# Patient Record
Sex: MALE | Race: OTHER | NOT HISPANIC OR LATINO | Employment: OTHER | URBAN - METROPOLITAN AREA
[De-identification: names, ages, dates, MRNs, and addresses within clinical notes are randomized per-mention and may not be internally consistent; named-entity substitution may affect disease eponyms.]

---

## 2017-06-01 ENCOUNTER — PREPPED CHART (OUTPATIENT)
Dept: URBAN - METROPOLITAN AREA CLINIC 43 | Facility: CLINIC | Age: 77
End: 2017-06-01

## 2018-05-31 ENCOUNTER — ESTABLISHED COMPREHENSIVE EXAM (OUTPATIENT)
Dept: URBAN - METROPOLITAN AREA CLINIC 43 | Facility: CLINIC | Age: 78
End: 2018-05-31

## 2018-05-31 DIAGNOSIS — H35.373: ICD-10-CM

## 2018-05-31 DIAGNOSIS — Z96.1: ICD-10-CM

## 2018-05-31 DIAGNOSIS — H35.3131: ICD-10-CM

## 2018-05-31 PROCEDURE — 4177F TALK PT/CRGVR RE AREDS PREV: CPT

## 2018-05-31 PROCEDURE — G8756 NO BP MEASURE DOC: HCPCS

## 2018-05-31 PROCEDURE — 92014 COMPRE OPH EXAM EST PT 1/>: CPT

## 2018-05-31 PROCEDURE — 92015 DETERMINE REFRACTIVE STATE: CPT

## 2018-05-31 PROCEDURE — 1036F TOBACCO NON-USER: CPT

## 2018-05-31 PROCEDURE — G8428 CUR MEDS NOT DOCUMENT: HCPCS

## 2018-05-31 PROCEDURE — 2019F DILATED MACUL EXAM DONE: CPT

## 2018-05-31 ASSESSMENT — TONOMETRY
OS_IOP_MMHG: 13
OD_IOP_MMHG: 15

## 2018-05-31 ASSESSMENT — VISUAL ACUITY
OD_SC: J2
OS_SC: J3
OD_SC: 20/40-1
OS_SC: 20/70-1

## 2019-08-20 PROBLEM — Z00.00 ENCOUNTER FOR PREVENTIVE HEALTH EXAMINATION: Status: ACTIVE | Noted: 2019-08-20

## 2019-10-16 ENCOUNTER — APPOINTMENT (OUTPATIENT)
Dept: INTERNAL MEDICINE | Facility: CLINIC | Age: 79
End: 2019-10-16

## 2021-06-01 ENCOUNTER — ESTABLISHED COMPREHENSIVE EXAM (OUTPATIENT)
Dept: URBAN - METROPOLITAN AREA CLINIC 43 | Facility: CLINIC | Age: 81
End: 2021-06-01

## 2021-06-01 DIAGNOSIS — Z96.1: ICD-10-CM

## 2021-06-01 DIAGNOSIS — H35.373: ICD-10-CM

## 2021-06-01 DIAGNOSIS — H35.3131: ICD-10-CM

## 2021-06-01 PROCEDURE — 92015 DETERMINE REFRACTIVE STATE: CPT

## 2021-06-01 PROCEDURE — 92014 COMPRE OPH EXAM EST PT 1/>: CPT

## 2021-06-01 ASSESSMENT — TONOMETRY
OD_IOP_MMHG: 14
OS_IOP_MMHG: 14

## 2021-06-01 ASSESSMENT — VISUAL ACUITY
OD_SC: J2-
OD_SC: 20/40-1
OS_SC: 20/50-1
OS_SC: J1-

## 2022-12-12 ENCOUNTER — COMPREHENSIVE EXAM (OUTPATIENT)
Dept: URBAN - METROPOLITAN AREA CLINIC 43 | Facility: CLINIC | Age: 82
End: 2022-12-12

## 2022-12-12 PROCEDURE — 92014 COMPRE OPH EXAM EST PT 1/>: CPT

## 2022-12-12 PROCEDURE — 92015 DETERMINE REFRACTIVE STATE: CPT

## 2022-12-12 ASSESSMENT — VISUAL ACUITY
OS_SC: 20/40-1
OD_SC: 20/40-1
OS_SC: J1-
OD_SC: J3

## 2022-12-12 ASSESSMENT — TONOMETRY
OS_IOP_MMHG: 16
OD_IOP_MMHG: 15

## 2023-09-05 ENCOUNTER — RESULT REVIEW (OUTPATIENT)
Age: 83
End: 2023-09-05

## 2023-09-06 ENCOUNTER — APPOINTMENT (OUTPATIENT)
Dept: NEUROSURGERY | Facility: HOSPITAL | Age: 83
End: 2023-09-06

## 2023-09-13 ENCOUNTER — TRANSCRIPTION ENCOUNTER (OUTPATIENT)
Age: 83
End: 2023-09-13

## 2023-09-17 DIAGNOSIS — Z86.79 PERSONAL HISTORY OF OTHER DISEASES OF THE CIRCULATORY SYSTEM: ICD-10-CM

## 2023-09-17 DIAGNOSIS — I51.9 HEART DISEASE, UNSPECIFIED: ICD-10-CM

## 2023-09-17 DIAGNOSIS — Z85.46 PERSONAL HISTORY OF MALIGNANT NEOPLASM OF PROSTATE: ICD-10-CM

## 2023-09-17 DIAGNOSIS — Z86.39 PERSONAL HISTORY OF OTHER ENDOCRINE, NUTRITIONAL AND METABOLIC DISEASE: ICD-10-CM

## 2023-09-17 DIAGNOSIS — S06.5X9A TRAUMATIC SUBDURAL HEMORRHAGE WITH LOSS OF CONSCIOUSNESS OF UNSPECIFIED DURATION, INITIAL ENCOUNTER: ICD-10-CM

## 2023-09-17 DIAGNOSIS — U07.1 COVID-19: ICD-10-CM

## 2023-09-17 DIAGNOSIS — Z85.528 PERSONAL HISTORY OF OTHER MALIGNANT NEOPLASM OF KIDNEY: ICD-10-CM

## 2023-09-17 RX ORDER — AZELASTINE HYDROCHLORIDE 137 UG/1
137 SPRAY, METERED NASAL
Refills: 0 | Status: ACTIVE | COMMUNITY

## 2023-09-17 RX ORDER — LOSARTAN POTASSIUM 100 MG/1
TABLET, FILM COATED ORAL
Refills: 0 | Status: ACTIVE | COMMUNITY

## 2023-09-17 RX ORDER — SIMVASTATIN 80 MG/1
TABLET, FILM COATED ORAL
Refills: 0 | Status: ACTIVE | COMMUNITY

## 2023-09-17 RX ORDER — COLESTIPOL HYDROCHLORIDE 1 G/1
TABLET, FILM COATED ORAL
Refills: 0 | Status: ACTIVE | COMMUNITY

## 2023-09-18 ENCOUNTER — APPOINTMENT (OUTPATIENT)
Dept: GERIATRICS | Facility: CLINIC | Age: 83
End: 2023-09-18
Payer: MEDICARE

## 2023-09-18 ENCOUNTER — MED ADMIN CHARGE (OUTPATIENT)
Age: 83
End: 2023-09-18

## 2023-09-18 VITALS
DIASTOLIC BLOOD PRESSURE: 60 MMHG | SYSTOLIC BLOOD PRESSURE: 118 MMHG | WEIGHT: 201 LBS | OXYGEN SATURATION: 99 % | HEART RATE: 86 BPM | HEIGHT: 71 IN | TEMPERATURE: 97.4 F | BODY MASS INDEX: 28.14 KG/M2

## 2023-09-18 DIAGNOSIS — Z71.89 OTHER SPECIFIED COUNSELING: ICD-10-CM

## 2023-09-18 DIAGNOSIS — D64.9 ANEMIA, UNSPECIFIED: ICD-10-CM

## 2023-09-18 DIAGNOSIS — R73.01 IMPAIRED FASTING GLUCOSE: ICD-10-CM

## 2023-09-18 DIAGNOSIS — Z13.1 ENCOUNTER FOR SCREENING FOR DIABETES MELLITUS: ICD-10-CM

## 2023-09-18 DIAGNOSIS — Z23 ENCOUNTER FOR IMMUNIZATION: ICD-10-CM

## 2023-09-18 PROCEDURE — G0008: CPT

## 2023-09-18 PROCEDURE — 99205 OFFICE O/P NEW HI 60 MIN: CPT | Mod: 25

## 2023-09-18 PROCEDURE — 90662 IIV NO PRSV INCREASED AG IM: CPT

## 2023-09-18 RX ORDER — SPIRONOLACTONE 25 MG/1
25 TABLET, FILM COATED ORAL DAILY
Refills: 0 | Status: ACTIVE | COMMUNITY

## 2023-09-20 LAB
ANION GAP SERPL CALC-SCNC: 9 MMOL/L
BUN SERPL-MCNC: 29 MG/DL
CALCIUM SERPL-MCNC: 8.7 MG/DL
CHLORIDE SERPL-SCNC: 105 MMOL/L
CHOLEST SERPL-MCNC: 156 MG/DL
CO2 SERPL-SCNC: 25 MMOL/L
CREAT SERPL-MCNC: 1.4 MG/DL
EGFR: 50 ML/MIN/1.73M2
ESTIMATED AVERAGE GLUCOSE: 105 MG/DL
FERRITIN SERPL-MCNC: 345 NG/ML
GLUCOSE SERPL-MCNC: 83 MG/DL
HBA1C MFR BLD HPLC: 5.3 %
HDLC SERPL-MCNC: 70 MG/DL
IRON SATN MFR SERPL: 39 %
IRON SERPL-MCNC: 97 UG/DL
LDLC SERPL CALC-MCNC: 77 MG/DL
NONHDLC SERPL-MCNC: 87 MG/DL
POTASSIUM SERPL-SCNC: 4.7 MMOL/L
SODIUM SERPL-SCNC: 140 MMOL/L
TIBC SERPL-MCNC: 248 UG/DL
TRANSFERRIN SERPL-MCNC: 202 MG/DL
TRIGL SERPL-MCNC: 46 MG/DL
UIBC SERPL-MCNC: 151 UG/DL

## 2023-10-02 ENCOUNTER — APPOINTMENT (OUTPATIENT)
Dept: CARDIOLOGY | Facility: CLINIC | Age: 83
End: 2023-10-02
Payer: MEDICARE

## 2023-10-02 ENCOUNTER — NON-APPOINTMENT (OUTPATIENT)
Age: 83
End: 2023-10-02

## 2023-10-02 VITALS
WEIGHT: 195 LBS | HEIGHT: 71 IN | SYSTOLIC BLOOD PRESSURE: 98 MMHG | OXYGEN SATURATION: 98 % | BODY MASS INDEX: 27.3 KG/M2 | HEART RATE: 57 BPM | DIASTOLIC BLOOD PRESSURE: 53 MMHG

## 2023-10-02 DIAGNOSIS — Z78.9 OTHER SPECIFIED HEALTH STATUS: ICD-10-CM

## 2023-10-02 PROCEDURE — 93000 ELECTROCARDIOGRAM COMPLETE: CPT

## 2023-10-02 PROCEDURE — 99214 OFFICE O/P EST MOD 30 MIN: CPT | Mod: 25

## 2023-10-02 RX ORDER — METOPROLOL SUCCINATE 25 MG/1
25 TABLET, EXTENDED RELEASE ORAL DAILY
Qty: 90 | Refills: 3 | Status: ACTIVE | COMMUNITY
Start: 2023-10-02 | End: 1900-01-01

## 2023-10-05 ENCOUNTER — NON-APPOINTMENT (OUTPATIENT)
Age: 83
End: 2023-10-05

## 2023-10-05 ENCOUNTER — APPOINTMENT (OUTPATIENT)
Dept: GERIATRICS | Facility: CLINIC | Age: 83
End: 2023-10-05
Payer: MEDICARE

## 2023-10-05 VITALS
DIASTOLIC BLOOD PRESSURE: 68 MMHG | OXYGEN SATURATION: 98 % | BODY MASS INDEX: 27.89 KG/M2 | WEIGHT: 200 LBS | HEART RATE: 79 BPM | TEMPERATURE: 97.4 F | SYSTOLIC BLOOD PRESSURE: 112 MMHG

## 2023-10-05 DIAGNOSIS — N18.9 CHRONIC KIDNEY DISEASE, UNSPECIFIED: ICD-10-CM

## 2023-10-05 DIAGNOSIS — R35.0 FREQUENCY OF MICTURITION: ICD-10-CM

## 2023-10-05 PROBLEM — Z78.9 NON-SMOKER: Status: ACTIVE | Noted: 2023-10-05

## 2023-10-05 PROCEDURE — 99215 OFFICE O/P EST HI 40 MIN: CPT

## 2023-10-05 RX ORDER — SPIRONOLACTONE 25 MG/1
25 TABLET ORAL
Refills: 0 | Status: DISCONTINUED | COMMUNITY
End: 2023-10-05

## 2023-10-05 RX ORDER — ACETAMINOPHEN 325 MG/1
TABLET, FILM COATED ORAL
Refills: 0 | Status: ACTIVE | COMMUNITY

## 2023-10-05 RX ORDER — AMLODIPINE BESYLATE 5 MG/1
TABLET ORAL
Refills: 0 | Status: DISCONTINUED | COMMUNITY
End: 2023-10-05

## 2023-10-05 RX ORDER — FLUTICASONE PROPIONATE 50 UG/1
50 SPRAY, METERED NASAL
Refills: 0 | Status: ACTIVE | COMMUNITY

## 2023-10-11 ENCOUNTER — APPOINTMENT (OUTPATIENT)
Dept: NEUROSURGERY | Facility: CLINIC | Age: 83
End: 2023-10-11
Payer: MEDICARE

## 2023-10-11 DIAGNOSIS — D32.0 BENIGN NEOPLASM OF CEREBRAL MENINGES: ICD-10-CM

## 2023-10-11 PROCEDURE — 99024 POSTOP FOLLOW-UP VISIT: CPT

## 2023-10-13 ENCOUNTER — APPOINTMENT (OUTPATIENT)
Dept: CARDIOLOGY | Facility: CLINIC | Age: 83
End: 2023-10-13
Payer: MEDICARE

## 2023-10-13 PROCEDURE — 93242 EXT ECG>48HR<7D RECORDING: CPT

## 2023-10-28 DIAGNOSIS — Z86.79 PERSONAL HISTORY OF OTHER DISEASES OF THE CIRCULATORY SYSTEM: ICD-10-CM

## 2023-10-28 PROCEDURE — 93244 EXT ECG>48HR<7D REV&INTERPJ: CPT

## 2023-11-14 ENCOUNTER — APPOINTMENT (OUTPATIENT)
Dept: CARDIOLOGY | Facility: CLINIC | Age: 83
End: 2023-11-14
Payer: MEDICARE

## 2023-11-14 VITALS
SYSTOLIC BLOOD PRESSURE: 140 MMHG | BODY MASS INDEX: 30.1 KG/M2 | HEIGHT: 71 IN | DIASTOLIC BLOOD PRESSURE: 90 MMHG | OXYGEN SATURATION: 97 % | HEART RATE: 88 BPM | WEIGHT: 215 LBS

## 2023-11-14 DIAGNOSIS — I38 ENDOCARDITIS, VALVE UNSPECIFIED: ICD-10-CM

## 2023-11-14 DIAGNOSIS — I10 ESSENTIAL (PRIMARY) HYPERTENSION: ICD-10-CM

## 2023-11-14 DIAGNOSIS — I48.20 CHRONIC ATRIAL FIBRILLATION, UNSP: ICD-10-CM

## 2023-11-14 DIAGNOSIS — Z78.9 OTHER SPECIFIED HEALTH STATUS: ICD-10-CM

## 2023-11-14 DIAGNOSIS — I51.9 HEART DISEASE, UNSPECIFIED: ICD-10-CM

## 2023-11-14 DIAGNOSIS — I50.9 HEART FAILURE, UNSPECIFIED: ICD-10-CM

## 2023-11-14 DIAGNOSIS — E78.5 HYPERLIPIDEMIA, UNSPECIFIED: ICD-10-CM

## 2023-11-14 DIAGNOSIS — Z86.018 PERSONAL HISTORY OF OTHER BENIGN NEOPLASM: ICD-10-CM

## 2023-11-14 PROCEDURE — 93000 ELECTROCARDIOGRAM COMPLETE: CPT

## 2023-11-14 PROCEDURE — 99214 OFFICE O/P EST MOD 30 MIN: CPT | Mod: 25

## 2023-11-15 ENCOUNTER — RESULT REVIEW (OUTPATIENT)
Age: 83
End: 2023-11-15

## 2023-11-17 PROBLEM — I48.20 CHRONIC ATRIAL FIBRILLATION: Status: ACTIVE | Noted: 2023-09-17

## 2023-11-17 PROBLEM — I51.9 LEFT VENTRICULAR SYSTOLIC DYSFUNCTION: Status: ACTIVE | Noted: 2023-10-02

## 2023-11-17 PROBLEM — I38 VALVULAR HEART DISEASE: Status: ACTIVE | Noted: 2023-10-05

## 2023-11-17 PROBLEM — I10 HYPERTENSION, UNSPECIFIED TYPE: Status: ACTIVE | Noted: 2023-09-17

## 2023-11-17 PROBLEM — E78.5 HYPERLIPIDEMIA, UNSPECIFIED HYPERLIPIDEMIA TYPE: Status: ACTIVE | Noted: 2023-09-17

## 2023-11-17 PROBLEM — I50.9 CONGESTIVE HEART FAILURE, UNSPECIFIED HF CHRONICITY, UNSPECIFIED HEART FAILURE TYPE: Status: ACTIVE | Noted: 2023-09-18

## 2023-11-17 RX ORDER — METOPROLOL SUCCINATE 200 MG/1
TABLET, EXTENDED RELEASE ORAL
Refills: 0 | Status: ACTIVE | COMMUNITY

## 2023-11-19 ENCOUNTER — NON-APPOINTMENT (OUTPATIENT)
Age: 83
End: 2023-11-19

## 2023-11-19 PROBLEM — Z78.9 SOCIAL ALCOHOL USE: Status: ACTIVE | Noted: 2023-11-19

## 2023-11-19 PROBLEM — Z86.018 HISTORY OF MENINGIOMA: Status: RESOLVED | Noted: 2023-11-19 | Resolved: 2023-11-19

## 2023-11-19 RX ORDER — APIXABAN 5 MG/1
TABLET, FILM COATED ORAL
Refills: 0 | Status: ACTIVE | COMMUNITY
Start: 2023-09-28

## 2023-12-12 ENCOUNTER — COMPREHENSIVE EXAM (OUTPATIENT)
Dept: URBAN - METROPOLITAN AREA CLINIC 43 | Facility: CLINIC | Age: 83
End: 2023-12-12

## 2023-12-12 DIAGNOSIS — H04.123: ICD-10-CM

## 2023-12-12 DIAGNOSIS — Z96.1: ICD-10-CM

## 2023-12-12 DIAGNOSIS — H35.373: ICD-10-CM

## 2023-12-12 DIAGNOSIS — H35.3131: ICD-10-CM

## 2023-12-12 PROCEDURE — 92015 DETERMINE REFRACTIVE STATE: CPT

## 2023-12-12 PROCEDURE — 92014 COMPRE OPH EXAM EST PT 1/>: CPT

## 2023-12-12 ASSESSMENT — VISUAL ACUITY
OS_SC: J2
OD_SC: 20/40-2
OD_SC: J4
OS_SC: 20/30-1

## 2023-12-12 ASSESSMENT — TONOMETRY
OD_IOP_MMHG: 14
OS_IOP_MMHG: 14

## 2024-01-16 RX ORDER — FUROSEMIDE 20 MG/1
20 TABLET ORAL
Qty: 90 | Refills: 1 | Status: ACTIVE | COMMUNITY
Start: 2023-11-14

## 2024-05-28 ENCOUNTER — APPOINTMENT (OUTPATIENT)
Dept: CARDIOLOGY | Facility: CLINIC | Age: 84
End: 2024-05-28

## 2024-06-13 RX ORDER — METOPROLOL SUCCINATE 50 MG/1
50 TABLET, EXTENDED RELEASE ORAL
Qty: 180 | Refills: 1 | Status: ACTIVE | COMMUNITY
Start: 2023-11-14

## 2024-07-10 ENCOUNTER — RX RENEWAL (OUTPATIENT)
Age: 84
End: 2024-07-10

## 2024-08-26 DIAGNOSIS — N28.1 CYST OF KIDNEY, ACQUIRED: ICD-10-CM

## 2024-08-26 DIAGNOSIS — Z95.2 PRESENCE OF PROSTHETIC HEART VALVE: ICD-10-CM

## 2024-08-26 DIAGNOSIS — Z86.79 PERSONAL HISTORY OF OTHER DISEASES OF THE CIRCULATORY SYSTEM: ICD-10-CM

## 2024-08-26 DIAGNOSIS — R91.1 SOLITARY PULMONARY NODULE: ICD-10-CM

## 2024-08-26 DIAGNOSIS — Z87.19 PERSONAL HISTORY OF OTHER DISEASES OF THE DIGESTIVE SYSTEM: ICD-10-CM

## 2024-08-26 DIAGNOSIS — M15.9 POLYOSTEOARTHRITIS, UNSPECIFIED: ICD-10-CM

## 2024-08-26 DIAGNOSIS — I87.2 VENOUS INSUFFICIENCY (CHRONIC) (PERIPHERAL): ICD-10-CM

## 2024-08-26 DIAGNOSIS — K76.89 OTHER SPECIFIED DISEASES OF LIVER: ICD-10-CM

## 2024-08-26 DIAGNOSIS — Z87.898 PERSONAL HISTORY OF OTHER SPECIFIED CONDITIONS: ICD-10-CM

## 2024-08-28 ENCOUNTER — NON-APPOINTMENT (OUTPATIENT)
Age: 84
End: 2024-08-28

## 2024-08-28 ENCOUNTER — APPOINTMENT (OUTPATIENT)
Dept: GERIATRICS | Facility: CLINIC | Age: 84
End: 2024-08-28
Payer: MEDICARE

## 2024-08-28 ENCOUNTER — APPOINTMENT (OUTPATIENT)
Dept: CARDIOLOGY | Facility: CLINIC | Age: 84
End: 2024-08-28
Payer: MEDICARE

## 2024-08-28 VITALS
SYSTOLIC BLOOD PRESSURE: 142 MMHG | DIASTOLIC BLOOD PRESSURE: 78 MMHG | HEIGHT: 71 IN | WEIGHT: 190 LBS | BODY MASS INDEX: 26.6 KG/M2 | HEART RATE: 48 BPM | OXYGEN SATURATION: 99 %

## 2024-08-28 VITALS
HEART RATE: 50 BPM | OXYGEN SATURATION: 99 % | TEMPERATURE: 97.3 F | WEIGHT: 188 LBS | HEIGHT: 71 IN | SYSTOLIC BLOOD PRESSURE: 120 MMHG | DIASTOLIC BLOOD PRESSURE: 70 MMHG | BODY MASS INDEX: 26.32 KG/M2

## 2024-08-28 VITALS
DIASTOLIC BLOOD PRESSURE: 78 MMHG | WEIGHT: 190 LBS | HEART RATE: 48 BPM | HEIGHT: 71 IN | BODY MASS INDEX: 26.6 KG/M2 | SYSTOLIC BLOOD PRESSURE: 142 MMHG | OXYGEN SATURATION: 99 %

## 2024-08-28 DIAGNOSIS — K52.832 LYMPHOCYTIC COLITIS: ICD-10-CM

## 2024-08-28 DIAGNOSIS — E78.5 HYPERLIPIDEMIA, UNSPECIFIED: ICD-10-CM

## 2024-08-28 DIAGNOSIS — I48.91 UNSPECIFIED ATRIAL FIBRILLATION: ICD-10-CM

## 2024-08-28 DIAGNOSIS — Z86.79 PERSONAL HISTORY OF OTHER DISEASES OF THE CIRCULATORY SYSTEM: ICD-10-CM

## 2024-08-28 DIAGNOSIS — I51.9 HEART DISEASE, UNSPECIFIED: ICD-10-CM

## 2024-08-28 DIAGNOSIS — I10 ESSENTIAL (PRIMARY) HYPERTENSION: ICD-10-CM

## 2024-08-28 DIAGNOSIS — K59.09 OTHER CONSTIPATION: ICD-10-CM

## 2024-08-28 DIAGNOSIS — Z81.8 FAMILY HISTORY OF OTHER MENTAL AND BEHAVIORAL DISORDERS: ICD-10-CM

## 2024-08-28 DIAGNOSIS — I50.9 HEART FAILURE, UNSPECIFIED: ICD-10-CM

## 2024-08-28 DIAGNOSIS — K52.9 NONINFECTIVE GASTROENTERITIS AND COLITIS, UNSPECIFIED: ICD-10-CM

## 2024-08-28 DIAGNOSIS — R00.1 BRADYCARDIA, UNSPECIFIED: ICD-10-CM

## 2024-08-28 PROCEDURE — 93246 EXT ECG>7D<15D RECORDING: CPT

## 2024-08-28 PROCEDURE — 99215 OFFICE O/P EST HI 40 MIN: CPT

## 2024-08-28 PROCEDURE — G0444 DEPRESSION SCREEN ANNUAL: CPT | Mod: 59

## 2024-08-28 PROCEDURE — 93000 ELECTROCARDIOGRAM COMPLETE: CPT | Mod: 59

## 2024-08-28 PROCEDURE — G2211 COMPLEX E/M VISIT ADD ON: CPT

## 2024-08-28 PROCEDURE — 99214 OFFICE O/P EST MOD 30 MIN: CPT

## 2024-08-28 NOTE — HISTORY OF PRESENT ILLNESS
[No falls in past year] : Patient reported no falls in the past year [NO] : No [0] : 2) Feeling down, depressed, or hopeless: Not at all (0) [PHQ-2 Negative - No further assessment needed] : PHQ-2 Negative - No further assessment needed [I have developed a follow-up plan documented below in the note.] : I have developed a follow-up plan documented below in the note. [FreeTextEntry1] :  is an 83yo male with HTN, HLD, afib on eliquis, hx of prostate and renal Ca (s/p kidney surgery and prostatectomy), hx subdural hematoma, hx seizures  h/o lymphocytic colitis. wants to switch from coletipol to something else.  had valve replacement x2 in april 2024.mitral and aortic. doing cardiac rehab.   #Afib eliquis + metoprolol ER 50mg QD  #CHF  lasix, simvastatin,   constipation  10/03/2024 Was admitted on 09/01 for c/o head pressure, found to have 2.9 cm solid mass in the L choroid plexus with surrounding vasogenic edema.  CT C and CT A/P concerning for pulmonary nodules + liver lesions. Received dexamethasone 4mg q6h. Echo showed EF-43%. 09/06 underwent L  temporal craniotomy resection of left ventricular mass complicated by aphasia and AAO x0 post-op. SLP f/u o/p. Agitation improved 09/11. 09/13 d/c with steroid taper over 1 week.2 mg Q8H for two days, Q12H for two days, daily for two days) 10 Per cardiology note, okay to start Eliquis for A-fib.  Patient is now on metoprolol succinate 25 mg daily and amlodipine has been discontinued.  Patient in discussion of cardiology about pulmonary vein isolation/radiofrequency ablation Can consider switch from losartan to Entresto if patient's vitals remained stable per cardiology.  Really appreciate and agree with recommendations.     #Multicomplexity  #Meningioma s/p L craniotomy with resection 09/06/23 O/P f/u with cardiology and also to discuss resuming eliquis NSG 11 oct f/u   #Afib on eliquis since june 2023 Hold eliquis until cards f/u Dr. 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  We will continue reduced dose of Aldactone  #CKD stage 3a 2/2 renal Ca (s/p kidney surgery and prostatectomy) referred to nephrology  last creat 1.4  Vaccines due: Tdap, pneumococcal, Shingrix.  Discussed with patient to get these vaccines. Patient has his birthday tomorrow will get these after his birthday.   [[id number]] : 0

## 2024-08-28 NOTE — PHYSICAL EXAM
[No Respiratory Distress] : no respiratory distress [No Acc Muscle Use] : no accessory muscle use [Respiration, Rhythm And Depth] : normal respiratory rhythm and effort [Auscultation Breath Sounds / Voice Sounds] : lungs were clear to auscultation bilaterally [Normal S1, S2] : normal S1 and S2 [Heart Rate And Rhythm] : heart rate was normal and rhythm regular [de-identified] : Has lipoma on his upper back left side

## 2024-08-29 PROBLEM — R00.1 BRADYCARDIA: Status: ACTIVE | Noted: 2024-08-29

## 2024-08-29 PROBLEM — I48.91 ATRIAL FIBRILLATION: Status: ACTIVE | Noted: 2024-08-26

## 2024-08-29 PROBLEM — Z86.79 HISTORY OF VALVULAR HEART DISEASE: Status: RESOLVED | Noted: 2023-09-17 | Resolved: 2024-08-29

## 2024-08-29 NOTE — DISCUSSION/SUMMARY
[FreeTextEntry1] : Atrial fibrillation/Bradycardia The working diagnosis is  atrial fibrillation secondary to hypertensive ??-atherosclerotic-valvular  heart disease.  At the time of the 4/20 4 aortic/mitral valve replacement surgery left atrial appendage ligation and pulmonary vein isolation was performed.  Subsequently electrocardioversion restored sinus rhythm.  An elevated [id number] vascular score is indicative of an increased risk of systemic/cerebral emboli. . Marked asymptomatic sinus bradycardia is in large part related to the administration of beta-blockers (metoprolol succinate 50 mg/day)Permanent pacemaker implantation is not indicated in the absence of symptomatic bradycardia or high degree AV block  I have recommended the following Continue the present medical regimen Mo 2-week mobile telemetry study 4/24 coronary arteriogram study not available at this time is requested for review.     Left ventricular systolic dysfunction/congestive heart failure.   The working diagnosis is  compensated  congestive heart failure secondary to heart failure with reduced ejection fraction (9/23 echocardiogram left ventricular ejection fraction 43%) due to hypertensive atherosclerotic-valvular heart disease exacerbated by atrial fibrillation with rapid ventricular rates. Following the 4/24 aortic/mitral valve replacement left ventricular systolic function has normalized.  The 7/24 echocardiogram revealed normal functioning of the bioprosthetic aortic/mitral valves.  The pulmonary artery systolic pressure was 20 mmHg.  The left ventricular ejection fraction was 60-65%.  The present cardiac physical examination is consistent with normal functioning of the prosthetic mitral/aortic valves and a euvolemic state New York Heart Association class II-III.  I have recommended the following Continue the present medical regimen Echocardiogram with next office evaluation Valve replacement surgery operative report, hospital medical records and coronary arteriogram study all not available at this time are requested for review Cardiac rehabilitation program to continue   Hypertension Hypertension is reportedly   controlled on the present medical regimen. Elevation of blood pressure on initial examination today (154/71 mmHg) is attributed in part to a whitecoat effect  .Renal insufficiency will influence medical management of hypertension  I have recommended the following a. Continue the present medical regimen b. Home blood pressure monitoring c  Routine laboratory studies including electrolytes and renal function test through primary care Dr. Blane ambrocio  Antihypertensive medical therapy adjustment dictated by the above and the patient's clinical course       Hyperlipidemia Hyperlipidemia represents a risk factor for atherosclerotic disease.  Coronary artery bypass graft surgery was not performed at the time of valve replacement suggesting the significant atherosclerotic heart disease The target LDL level is about 100  [employer] a reductase inhibitor therapy has been effective and well-tolerated  .In 9/23 the serum triglyceride level was 46 cholesterol 156 HDL 70 and LDL 77  .More recent lipid levels are not available for review.  The dose of simvastatin may be increased or replaced by a high intensity statin (rosuvastatin/atorvastatin) if required to  maintain optimal levels.  I have recommended the following: Low-fat low-cholesterol low-salt heart healthy diet.  Regular aerobic exercise to the extent possible Target LDL level to about 100 Noninvasive studies for coronary disease not available at this time are requested for review Increase simvastatin dose or replace with high intensity statin (rosuvastatin/atorvastatin) if required to maintain optimal levels as discussed above  lipid profile through primary care physician Dr. Arguelles   The diagnosis, prognosis, risks, options and alternatives were explained at length to the patient and family.  All questions were answered.  Issues discussed included cardiac rehabilitation atrial fibrillation anticoagulation left ventricular systolic dysfunction  congestive heart failure hypertension/hypotension hyperlipidemia diet and exercise.  Counseling and/or coordination of care Time was a significant factor for this patient encounter.  Total time spent with the patient and family was 40 minutes.  Greater than 50% of the time was devoted to counseling and/or coordination of care

## 2024-08-29 NOTE — PHYSICAL EXAM
[Normal Conjunctiva] : normal conjunctiva [Normal S1, S2] : normal S1, S2 [Soft] : abdomen soft [Non Tender] : non-tender [Normal Bowel Sounds] : normal bowel sounds [Normal Gait] : normal gait [No Rash] : no rash [No Focal Deficits] : no focal deficits [de-identified] :  Appears fit younger than his stated age awake and alert lying flat [de-identified] : No neck vein distention.  No carotid bruit [de-identified] : regular rhythm no gallop.  No murmur.  No diastolic sounds [de-identified] : Decreased breath sounds at the left  base.  No wheezing.  No rales [de-identified] : Full range of motion. Median sternotomy scar well-healed [de-identified] : Trace edema bilaterally.. venous stasis changes [de-identified] : parishnt

## 2024-08-29 NOTE — DISCUSSION/SUMMARY
[FreeTextEntry1] : Atrial fibrillation/Bradycardia The working diagnosis is  atrial fibrillation secondary to hypertensive ??-atherosclerotic-valvular  heart disease.  At the time of the 4/20 4 aortic/mitral valve replacement surgery left atrial appendage ligation and pulmonary vein isolation was performed.  Subsequently electrocardioversion restored sinus rhythm.  An elevated [id number] vascular score is indicative of an increased risk of systemic/cerebral emboli. . Marked asymptomatic sinus bradycardia is in large part related to the administration of beta-blockers (metoprolol succinate 50 mg/day)Permanent pacemaker implantation is not indicated in the absence of symptomatic bradycardia or high degree AV block  I have recommended the following Continue the present medical regimen Mo 2-week mobile telemetry study 4/24 coronary arteriogram study not available at this time is requested for review.     Left ventricular systolic dysfunction/congestive heart failure.   The working diagnosis is  compensated  congestive heart failure secondary to heart failure with reduced ejection fraction (9/23 echocardiogram left ventricular ejection fraction 43%) due to hypertensive atherosclerotic-valvular heart disease exacerbated by atrial fibrillation with rapid ventricular rates. Following the 4/24 aortic/mitral valve replacement left ventricular systolic function has normalized.  The 7/24 echocardiogram revealed normal functioning of the bioprosthetic aortic/mitral valves.  The pulmonary artery systolic pressure was 20 mmHg.  The left ventricular ejection fraction was 60-65%.  The present cardiac physical examination is consistent with normal functioning of the prosthetic mitral/aortic valves and a euvolemic state New York Heart Association class II-III.  I have recommended the following Continue the present medical regimen Echocardiogram with next office evaluation Valve replacement surgery operative report, hospital medical records and coronary arteriogram study all not available at this time are requested for review Cardiac rehabilitation program to continue   Hypertension Hypertension is reportedly   controlled on the present medical regimen. Elevation of blood pressure on initial examination today (154/71 mmHg) is attributed in part to a whitecoat effect  .Renal insufficiency will influence medical management of hypertension  I have recommended the following a. Continue the present medical regimen b. Home blood pressure monitoring c  Routine laboratory studies including electrolytes and renal function test through primary care Dr. Blane ambrocio  Antihypertensive medical therapy adjustment dictated by the above and the patient's clinical course       Hyperlipidemia Hyperlipidemia represents a risk factor for atherosclerotic disease.  Coronary artery bypass graft surgery was not performed at the time of valve replacement suggesting the significant atherosclerotic heart disease The target LDL level is about 100  [employer] a reductase inhibitor therapy has been effective and well-tolerated  .In 9/23 the serum triglyceride level was 46 cholesterol 156 HDL 70 and LDL 77  .More recent lipid levels are not available for review.  The dose of simvastatin may be increased or replaced by a high intensity statin (rosuvastatin/atorvastatin) if required to  maintain optimal levels.  I have recommended the following: Low-fat low-cholesterol low-salt heart healthy diet.  Regular aerobic exercise to the extent possible Target LDL level to about 100 Noninvasive studies for coronary disease not available at this time are requested for review Increase simvastatin dose or replace with high intensity statin (rosuvastatin/atorvastatin) if required to maintain optimal levels as discussed above  lipid profile through primary care physician Dr. Arguelles   The diagnosis, prognosis, risks, options and alternatives were explained at length to the patient and family.  All questions were answered.  Issues discussed included cardiac rehabilitation atrial fibrillation anticoagulation left ventricular systolic dysfunction  congestive heart failure hypertension/hypotension hyperlipidemia diet and exercise.  Counseling and/or coordination of care Time was a significant factor for this patient encounter.  Total time spent with the patient and family was 40 minutes.  Greater than 50% of the time was devoted to counseling and/or coordination of care

## 2024-08-29 NOTE — PHYSICAL EXAM
[Normal Conjunctiva] : normal conjunctiva [Normal S1, S2] : normal S1, S2 [Soft] : abdomen soft [Non Tender] : non-tender [Normal Bowel Sounds] : normal bowel sounds [Normal Gait] : normal gait [No Rash] : no rash [No Focal Deficits] : no focal deficits [de-identified] :  Appears fit younger than his stated age awake and alert lying flat [de-identified] : No neck vein distention.  No carotid bruit [de-identified] : regular rhythm no gallop.  No murmur.  No diastolic sounds [de-identified] : Decreased breath sounds at the left  base.  No wheezing.  No rales [de-identified] : Full range of motion. Median sternotomy scar well-healed [de-identified] : Trace edema bilaterally.. venous stasis changes [de-identified] : parishnt

## 2024-08-29 NOTE — HISTORY OF PRESENT ILLNESS
[FreeTextEntry1] : 83-year-old man Routine follow-up for valvular heart disease atrial fibrillation congestive heart failure hypertension  In 4/24 Mr. Parra underwent mitral valve and aortic valve replacement with left atrial appendage ligation and bilateral pulmonary vein isolation.  The operative report and preoperative angiographic studies are not available for review.  Subsequently he underwent electrical cardioversion with restoration of sinus rhythm.  At the present time Arthur complains of dyspnea on exertion.  No chest pain the symptoms are not progressive or severe.  No palpitations.  No bleeding complications while taking apixaban.  No dizziness.  No syncope.   Mr. Parra  is accompanied today by his wife

## 2024-09-20 ENCOUNTER — APPOINTMENT (OUTPATIENT)
Dept: CARDIOLOGY | Facility: CLINIC | Age: 84
End: 2024-09-20

## 2024-09-21 PROCEDURE — 93248 EXT ECG>7D<15D REV&INTERPJ: CPT

## 2024-10-04 ENCOUNTER — RESULT REVIEW (OUTPATIENT)
Age: 84
End: 2024-10-04

## 2024-10-07 ENCOUNTER — MED ADMIN CHARGE (OUTPATIENT)
Age: 84
End: 2024-10-07

## 2024-10-07 ENCOUNTER — APPOINTMENT (OUTPATIENT)
Dept: GERIATRICS | Facility: CLINIC | Age: 84
End: 2024-10-07
Payer: MEDICARE

## 2024-10-07 VITALS
HEART RATE: 52 BPM | HEIGHT: 71 IN | BODY MASS INDEX: 27.86 KG/M2 | OXYGEN SATURATION: 95 % | DIASTOLIC BLOOD PRESSURE: 72 MMHG | WEIGHT: 199 LBS | SYSTOLIC BLOOD PRESSURE: 132 MMHG

## 2024-10-07 DIAGNOSIS — Z87.19 PERSONAL HISTORY OF OTHER DISEASES OF THE DIGESTIVE SYSTEM: ICD-10-CM

## 2024-10-07 DIAGNOSIS — K59.09 OTHER CONSTIPATION: ICD-10-CM

## 2024-10-07 DIAGNOSIS — N18.9 CHRONIC KIDNEY DISEASE, UNSPECIFIED: ICD-10-CM

## 2024-10-07 DIAGNOSIS — Z23 ENCOUNTER FOR IMMUNIZATION: ICD-10-CM

## 2024-10-07 DIAGNOSIS — E78.5 HYPERLIPIDEMIA, UNSPECIFIED: ICD-10-CM

## 2024-10-07 DIAGNOSIS — R73.01 IMPAIRED FASTING GLUCOSE: ICD-10-CM

## 2024-10-07 DIAGNOSIS — M25.551 PAIN IN RIGHT HIP: ICD-10-CM

## 2024-10-07 PROBLEM — K76.89 HEPATIC CYST: Status: ACTIVE | Noted: 2024-08-26

## 2024-10-07 PROCEDURE — G0008: CPT

## 2024-10-07 PROCEDURE — 90662 IIV NO PRSV INCREASED AG IM: CPT

## 2024-10-07 PROCEDURE — 99497 ADVNCD CARE PLAN 30 MIN: CPT

## 2024-10-07 PROCEDURE — G0439: CPT

## 2024-10-07 PROCEDURE — G0444 DEPRESSION SCREEN ANNUAL: CPT | Mod: 59

## 2024-10-09 ENCOUNTER — RESULT REVIEW (OUTPATIENT)
Age: 84
End: 2024-10-09

## 2024-10-15 ENCOUNTER — APPOINTMENT (OUTPATIENT)
Dept: NEUROSURGERY | Facility: CLINIC | Age: 84
End: 2024-10-15
Payer: MEDICARE

## 2024-10-15 PROCEDURE — 99443: CPT | Mod: 93

## 2024-10-18 ENCOUNTER — APPOINTMENT (OUTPATIENT)
Dept: GASTROENTEROLOGY | Facility: CLINIC | Age: 84
End: 2024-10-18
Payer: MEDICARE

## 2024-10-18 VITALS
WEIGHT: 199 LBS | DIASTOLIC BLOOD PRESSURE: 70 MMHG | BODY MASS INDEX: 27.86 KG/M2 | HEIGHT: 71 IN | SYSTOLIC BLOOD PRESSURE: 130 MMHG

## 2024-10-18 DIAGNOSIS — K76.89 OTHER SPECIFIED DISEASES OF LIVER: ICD-10-CM

## 2024-10-18 DIAGNOSIS — D64.9 ANEMIA, UNSPECIFIED: ICD-10-CM

## 2024-10-18 DIAGNOSIS — K52.832 LYMPHOCYTIC COLITIS: ICD-10-CM

## 2024-10-18 PROCEDURE — 99204 OFFICE O/P NEW MOD 45 MIN: CPT

## 2024-10-21 LAB
AFP-TM SERPL-MCNC: 4.8 NG/ML
CANCER AG19-9 SERPL-ACNC: 7 U/ML
FERRITIN SERPL-MCNC: 87 NG/ML
IRON SATN MFR SERPL: 27 %
IRON SERPL-MCNC: 92 UG/DL
TIBC SERPL-MCNC: 339 UG/DL
UIBC SERPL-MCNC: 247 UG/DL

## 2024-10-22 ENCOUNTER — TRANSCRIPTION ENCOUNTER (OUTPATIENT)
Age: 84
End: 2024-10-22

## 2024-10-22 ENCOUNTER — APPOINTMENT (OUTPATIENT)
Dept: CARDIOLOGY | Facility: CLINIC | Age: 84
End: 2024-10-22
Payer: MEDICARE

## 2024-10-22 DIAGNOSIS — Z95.2 PRESENCE OF PROSTHETIC HEART VALVE: ICD-10-CM

## 2024-10-22 DIAGNOSIS — Z86.79 PERSONAL HISTORY OF OTHER DISEASES OF THE CIRCULATORY SYSTEM: ICD-10-CM

## 2024-10-22 PROCEDURE — 93306 TTE W/DOPPLER COMPLETE: CPT

## 2024-10-30 ENCOUNTER — NON-APPOINTMENT (OUTPATIENT)
Age: 84
End: 2024-10-30

## 2024-10-30 ENCOUNTER — APPOINTMENT (OUTPATIENT)
Dept: CARDIOLOGY | Facility: CLINIC | Age: 84
End: 2024-10-30
Payer: MEDICARE

## 2024-10-30 VITALS
HEART RATE: 53 BPM | DIASTOLIC BLOOD PRESSURE: 65 MMHG | BODY MASS INDEX: 26.6 KG/M2 | HEIGHT: 71 IN | SYSTOLIC BLOOD PRESSURE: 131 MMHG | WEIGHT: 190 LBS | OXYGEN SATURATION: 99 %

## 2024-10-30 DIAGNOSIS — I50.9 HEART FAILURE, UNSPECIFIED: ICD-10-CM

## 2024-10-30 DIAGNOSIS — R00.1 BRADYCARDIA, UNSPECIFIED: ICD-10-CM

## 2024-10-30 DIAGNOSIS — E78.5 HYPERLIPIDEMIA, UNSPECIFIED: ICD-10-CM

## 2024-10-30 DIAGNOSIS — I38 ENDOCARDITIS, VALVE UNSPECIFIED: ICD-10-CM

## 2024-10-30 DIAGNOSIS — I48.91 UNSPECIFIED ATRIAL FIBRILLATION: ICD-10-CM

## 2024-10-30 DIAGNOSIS — I51.9 HEART DISEASE, UNSPECIFIED: ICD-10-CM

## 2024-10-30 DIAGNOSIS — N18.9 CHRONIC KIDNEY DISEASE, UNSPECIFIED: ICD-10-CM

## 2024-10-30 DIAGNOSIS — I10 ESSENTIAL (PRIMARY) HYPERTENSION: ICD-10-CM

## 2024-10-30 PROCEDURE — 99214 OFFICE O/P EST MOD 30 MIN: CPT

## 2024-10-30 PROCEDURE — 93000 ELECTROCARDIOGRAM COMPLETE: CPT

## 2024-11-01 PROBLEM — N18.9 CHRONIC RENAL INSUFFICIENCY: Status: RESOLVED | Noted: 2024-11-01 | Resolved: 2024-11-01

## 2024-12-16 ENCOUNTER — COMPREHENSIVE EXAM (OUTPATIENT)
Age: 84
End: 2024-12-16

## 2024-12-16 DIAGNOSIS — H04.123: ICD-10-CM

## 2024-12-16 DIAGNOSIS — Z96.1: ICD-10-CM

## 2024-12-16 DIAGNOSIS — H35.3131: ICD-10-CM

## 2024-12-16 DIAGNOSIS — H35.373: ICD-10-CM

## 2024-12-16 PROCEDURE — 92014 COMPRE OPH EXAM EST PT 1/>: CPT

## 2024-12-16 PROCEDURE — 92015 DETERMINE REFRACTIVE STATE: CPT

## 2025-05-27 ENCOUNTER — APPOINTMENT (OUTPATIENT)
Dept: CARDIOLOGY | Facility: CLINIC | Age: 85
End: 2025-05-27

## 2025-06-30 ENCOUNTER — APPOINTMENT (OUTPATIENT)
Dept: GERIATRICS | Facility: CLINIC | Age: 85
End: 2025-06-30
Payer: MEDICARE

## 2025-06-30 VITALS
SYSTOLIC BLOOD PRESSURE: 100 MMHG | WEIGHT: 198 LBS | OXYGEN SATURATION: 99 % | HEART RATE: 93 BPM | HEIGHT: 71 IN | TEMPERATURE: 97.5 F | BODY MASS INDEX: 27.72 KG/M2 | DIASTOLIC BLOOD PRESSURE: 78 MMHG

## 2025-06-30 PROBLEM — J31.0 RHINITIS: Status: ACTIVE | Noted: 2025-06-30

## 2025-06-30 PROBLEM — G47.00 INSOMNIA: Status: ACTIVE | Noted: 2025-06-30

## 2025-06-30 PROCEDURE — G2211 COMPLEX E/M VISIT ADD ON: CPT

## 2025-06-30 PROCEDURE — 99215 OFFICE O/P EST HI 40 MIN: CPT

## 2025-06-30 RX ORDER — IPRATROPIUM BROMIDE 21 UG/1
0.03 SPRAY, METERED NASAL TWICE DAILY
Qty: 1 | Refills: 1 | Status: ACTIVE | COMMUNITY
Start: 2025-06-30 | End: 1900-01-01

## 2025-07-02 ENCOUNTER — RX RENEWAL (OUTPATIENT)
Age: 85
End: 2025-07-02

## 2025-07-09 ENCOUNTER — APPOINTMENT (OUTPATIENT)
Dept: CARDIOLOGY | Facility: CLINIC | Age: 85
End: 2025-07-09
Payer: MEDICARE

## 2025-07-09 VITALS
OXYGEN SATURATION: 97 % | WEIGHT: 202.13 LBS | HEART RATE: 91 BPM | BODY MASS INDEX: 28.19 KG/M2 | DIASTOLIC BLOOD PRESSURE: 82 MMHG | SYSTOLIC BLOOD PRESSURE: 120 MMHG

## 2025-07-09 PROCEDURE — 99215 OFFICE O/P EST HI 40 MIN: CPT

## 2025-07-09 PROCEDURE — G2211 COMPLEX E/M VISIT ADD ON: CPT

## 2025-07-09 PROCEDURE — 93000 ELECTROCARDIOGRAM COMPLETE: CPT

## 2025-07-10 ENCOUNTER — NON-APPOINTMENT (OUTPATIENT)
Age: 85
End: 2025-07-10

## 2025-07-10 PROBLEM — I51.89 LEFT VENTRICULAR SYSTOLIC DYSFUNCTION: Status: ACTIVE | Noted: 2023-10-02

## 2025-07-10 RX ORDER — AMIODARONE HYDROCHLORIDE 200 MG/1
200 TABLET ORAL DAILY
Qty: 60 | Refills: 0 | Status: ACTIVE | COMMUNITY
Start: 2025-07-10 | End: 1900-01-01

## 2025-07-29 ENCOUNTER — APPOINTMENT (OUTPATIENT)
Dept: CARDIOLOGY | Facility: CLINIC | Age: 85
End: 2025-07-29
Payer: MEDICARE

## 2025-07-29 DIAGNOSIS — Z95.2 PRESENCE OF PROSTHETIC HEART VALVE: ICD-10-CM

## 2025-07-29 PROCEDURE — 93246 EXT ECG>7D<15D RECORDING: CPT

## 2025-07-29 PROCEDURE — 93306 TTE W/DOPPLER COMPLETE: CPT

## 2025-07-30 DIAGNOSIS — Z86.79 PERSONAL HISTORY OF OTHER DISEASES OF THE CIRCULATORY SYSTEM: ICD-10-CM

## 2025-07-31 ENCOUNTER — APPOINTMENT (OUTPATIENT)
Dept: GERIATRICS | Facility: CLINIC | Age: 85
End: 2025-07-31
Payer: MEDICARE

## 2025-07-31 VITALS
BODY MASS INDEX: 16.88 KG/M2 | DIASTOLIC BLOOD PRESSURE: 64 MMHG | HEART RATE: 67 BPM | WEIGHT: 121 LBS | TEMPERATURE: 97.6 F | SYSTOLIC BLOOD PRESSURE: 122 MMHG | OXYGEN SATURATION: 98 %

## 2025-07-31 VITALS
HEIGHT: 71 IN | TEMPERATURE: 97.6 F | DIASTOLIC BLOOD PRESSURE: 96 MMHG | WEIGHT: 204 LBS | BODY MASS INDEX: 28.56 KG/M2 | HEART RATE: 91 BPM | SYSTOLIC BLOOD PRESSURE: 132 MMHG | OXYGEN SATURATION: 99 %

## 2025-07-31 DIAGNOSIS — I50.9 HEART FAILURE, UNSPECIFIED: ICD-10-CM

## 2025-07-31 DIAGNOSIS — I48.91 UNSPECIFIED ATRIAL FIBRILLATION: ICD-10-CM

## 2025-07-31 PROCEDURE — 99213 OFFICE O/P EST LOW 20 MIN: CPT

## 2025-07-31 PROCEDURE — G2211 COMPLEX E/M VISIT ADD ON: CPT

## 2025-08-06 ENCOUNTER — NON-APPOINTMENT (OUTPATIENT)
Age: 85
End: 2025-08-06

## 2025-08-06 RX ORDER — AMOXICILLIN 500 MG/1
500 TABLET, FILM COATED ORAL
Qty: 4 | Refills: 4 | Status: ACTIVE | COMMUNITY
Start: 2025-08-06 | End: 1900-01-01

## 2025-08-10 PROCEDURE — 93244 EXT ECG>48HR<7D REV&INTERPJ: CPT

## 2025-08-18 ENCOUNTER — APPOINTMENT (OUTPATIENT)
Dept: CARDIOLOGY | Facility: CLINIC | Age: 85
End: 2025-08-18
Payer: MEDICARE

## 2025-08-18 ENCOUNTER — NON-APPOINTMENT (OUTPATIENT)
Age: 85
End: 2025-08-18

## 2025-08-18 VITALS
HEART RATE: 83 BPM | BODY MASS INDEX: 28.23 KG/M2 | WEIGHT: 202.38 LBS | SYSTOLIC BLOOD PRESSURE: 142 MMHG | OXYGEN SATURATION: 99 % | DIASTOLIC BLOOD PRESSURE: 90 MMHG

## 2025-08-18 DIAGNOSIS — R06.00 DYSPNEA, UNSPECIFIED: ICD-10-CM

## 2025-08-18 DIAGNOSIS — I51.89 OTHER ILL-DEFINED HEART DISEASES: ICD-10-CM

## 2025-08-18 DIAGNOSIS — E78.5 HYPERLIPIDEMIA, UNSPECIFIED: ICD-10-CM

## 2025-08-18 DIAGNOSIS — I50.9 HEART FAILURE, UNSPECIFIED: ICD-10-CM

## 2025-08-18 DIAGNOSIS — I10 ESSENTIAL (PRIMARY) HYPERTENSION: ICD-10-CM

## 2025-08-18 DIAGNOSIS — I48.92 UNSPECIFIED ATRIAL FLUTTER: ICD-10-CM

## 2025-08-18 DIAGNOSIS — I48.91 UNSPECIFIED ATRIAL FIBRILLATION: ICD-10-CM

## 2025-08-18 DIAGNOSIS — I38 ENDOCARDITIS, VALVE UNSPECIFIED: ICD-10-CM

## 2025-08-18 DIAGNOSIS — R00.1 BRADYCARDIA, UNSPECIFIED: ICD-10-CM

## 2025-08-18 PROCEDURE — G2211 COMPLEX E/M VISIT ADD ON: CPT

## 2025-08-18 PROCEDURE — 36415 COLL VENOUS BLD VENIPUNCTURE: CPT

## 2025-08-18 PROCEDURE — 99215 OFFICE O/P EST HI 40 MIN: CPT

## 2025-08-18 PROCEDURE — 93000 ELECTROCARDIOGRAM COMPLETE: CPT

## 2025-08-18 RX ORDER — APIXABAN 5 MG/1
5 TABLET, FILM COATED ORAL
Qty: 180 | Refills: 3 | Status: ACTIVE | COMMUNITY
Start: 2025-08-18 | End: 1900-01-01

## 2025-08-18 RX ORDER — DAPAGLIFLOZIN 10 MG/1
10 TABLET, FILM COATED ORAL DAILY
Qty: 90 | Refills: 3 | Status: ACTIVE | COMMUNITY
Start: 2025-08-18 | End: 1900-01-01

## 2025-08-19 ENCOUNTER — NON-APPOINTMENT (OUTPATIENT)
Age: 85
End: 2025-08-19

## 2025-08-20 PROBLEM — I48.92 ATRIAL FLUTTER: Status: ACTIVE | Noted: 2025-08-20

## 2025-08-28 ENCOUNTER — APPOINTMENT (OUTPATIENT)
Dept: CARDIOLOGY | Facility: CLINIC | Age: 85
End: 2025-08-28

## 2025-08-28 ENCOUNTER — TRANSCRIPTION ENCOUNTER (OUTPATIENT)
Age: 85
End: 2025-08-28

## 2025-09-12 ENCOUNTER — NON-APPOINTMENT (OUTPATIENT)
Age: 85
End: 2025-09-12

## 2025-09-15 ENCOUNTER — APPOINTMENT (OUTPATIENT)
Dept: NEUROSURGERY | Facility: CLINIC | Age: 85
End: 2025-09-15

## 2025-09-15 RX ORDER — AMIODARONE HYDROCHLORIDE 100 MG/1
100 TABLET ORAL
Qty: 90 | Refills: 3 | Status: ACTIVE | COMMUNITY
Start: 2025-09-15 | End: 1900-01-01